# Patient Record
Sex: MALE | Race: OTHER | Employment: OTHER | URBAN - METROPOLITAN AREA
[De-identification: names, ages, dates, MRNs, and addresses within clinical notes are randomized per-mention and may not be internally consistent; named-entity substitution may affect disease eponyms.]

---

## 2024-04-16 ENCOUNTER — APPOINTMENT (EMERGENCY)
Dept: RADIOLOGY | Facility: HOSPITAL | Age: 64
End: 2024-04-16
Payer: COMMERCIAL

## 2024-04-16 ENCOUNTER — HOSPITAL ENCOUNTER (EMERGENCY)
Facility: HOSPITAL | Age: 64
Discharge: HOME | End: 2024-04-16
Attending: EMERGENCY MEDICINE
Payer: COMMERCIAL

## 2024-04-16 VITALS
WEIGHT: 199.3 LBS | DIASTOLIC BLOOD PRESSURE: 77 MMHG | RESPIRATION RATE: 17 BRPM | SYSTOLIC BLOOD PRESSURE: 146 MMHG | OXYGEN SATURATION: 97 % | HEART RATE: 108 BPM | TEMPERATURE: 98.1 F
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
## 2024-04-16 DIAGNOSIS — S20.212A RIB CONTUSION, LEFT, INITIAL ENCOUNTER: Primary | ICD-10-CM

## 2024-04-16 LAB
ATRIAL RATE: 118 BPM
P AXIS: 72 DEGREES
PR INTERVAL: 160 MS
QRS AXIS: 7 DEGREES
QRSD INTERVAL: 70 MS
QT INTERVAL: 316 MS
QTC INTERVAL: 442 MS
T WAVE AXIS: 48 DEGREES
VENTRICULAR RATE: 118 BPM

## 2024-04-16 PROCEDURE — 96374 THER/PROPH/DIAG INJ IV PUSH: CPT

## 2024-04-16 PROCEDURE — 71101 X-RAY EXAM UNILAT RIBS/CHEST: CPT

## 2024-04-16 PROCEDURE — 93010 ELECTROCARDIOGRAM REPORT: CPT

## 2024-04-16 PROCEDURE — 93005 ELECTROCARDIOGRAM TRACING: CPT

## 2024-04-16 PROCEDURE — 99284 EMERGENCY DEPT VISIT MOD MDM: CPT | Performed by: EMERGENCY MEDICINE

## 2024-04-16 PROCEDURE — 99284 EMERGENCY DEPT VISIT MOD MDM: CPT

## 2024-04-16 RX ORDER — KETOROLAC TROMETHAMINE 30 MG/ML
30 INJECTION, SOLUTION INTRAMUSCULAR; INTRAVENOUS ONCE
Status: COMPLETED | OUTPATIENT
Start: 2024-04-16 | End: 2024-04-16

## 2024-04-16 RX ORDER — KETOROLAC TROMETHAMINE 30 MG/ML
30 INJECTION, SOLUTION INTRAMUSCULAR; INTRAVENOUS ONCE
Status: DISCONTINUED | OUTPATIENT
Start: 2024-04-16 | End: 2024-04-16

## 2024-04-16 RX ADMIN — KETOROLAC TROMETHAMINE 30 MG: 30 INJECTION, SOLUTION INTRAMUSCULAR; INTRAVENOUS at 12:22
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
## 2024-04-16 NOTE — DISCHARGE INSTRUCTIONS
Puede alternar paracetamol 1000 mg e ibuprofeno 600 mg cada 3 horas para el dolor. Trate de cronometrar frye ibuprofeno para que pueda tomarlo cuando coma. Además, puede aplicar hielo dagoberto 15 a 20 minutos cada 1 a 2 horas mientras está despierto para un control adicional del dolor.

## 2024-04-16 NOTE — Clinical Note
Barak Del Valle was seen and treated in our emergency department on 4/16/2024.                Diagnosis:     Barak  may return to work on return date.    He may return on this date: 04/18/2024         If you have any questions or concerns, please don't hesitate to call.      Lucy Spivey, DO    ______________________________           _______________          _______________  Hospital Representative                              Date                                Time